# Patient Record
Sex: MALE | Race: WHITE
[De-identification: names, ages, dates, MRNs, and addresses within clinical notes are randomized per-mention and may not be internally consistent; named-entity substitution may affect disease eponyms.]

---

## 2017-01-03 ENCOUNTER — HOSPITAL ENCOUNTER (OUTPATIENT)
Dept: HOSPITAL 45 - X.SURG | Age: 62
Discharge: HOME | End: 2017-01-03
Attending: SURGERY
Payer: COMMERCIAL

## 2017-01-03 VITALS
BODY MASS INDEX: 25.11 KG/M2 | HEIGHT: 70.51 IN | HEIGHT: 70.51 IN | BODY MASS INDEX: 25.11 KG/M2 | WEIGHT: 177.36 LBS | WEIGHT: 177.36 LBS

## 2017-01-03 VITALS
DIASTOLIC BLOOD PRESSURE: 81 MMHG | HEART RATE: 74 BPM | SYSTOLIC BLOOD PRESSURE: 124 MMHG | OXYGEN SATURATION: 98 % | TEMPERATURE: 98.42 F

## 2017-01-03 DIAGNOSIS — Z82.49: ICD-10-CM

## 2017-01-03 DIAGNOSIS — Z90.89: ICD-10-CM

## 2017-01-03 DIAGNOSIS — M21.70: ICD-10-CM

## 2017-01-03 DIAGNOSIS — Z91.048: ICD-10-CM

## 2017-01-03 DIAGNOSIS — Z88.2: ICD-10-CM

## 2017-01-03 DIAGNOSIS — E03.9: ICD-10-CM

## 2017-01-03 DIAGNOSIS — K40.90: Primary | ICD-10-CM

## 2017-01-03 DIAGNOSIS — E78.00: ICD-10-CM

## 2017-01-03 DIAGNOSIS — Z86.19: ICD-10-CM

## 2017-01-03 DIAGNOSIS — Z82.61: ICD-10-CM

## 2017-01-03 DIAGNOSIS — Z83.3: ICD-10-CM

## 2017-01-03 DIAGNOSIS — E06.3: ICD-10-CM

## 2017-01-03 RX ADMIN — HYDROCODONE BITARTRATE AND ACETAMINOPHEN PRN TAB: 5; 325 TABLET ORAL at 14:50

## 2017-01-03 RX ADMIN — HYDROCODONE BITARTRATE AND ACETAMINOPHEN PRN TAB: 5; 325 TABLET ORAL at 15:00

## 2017-01-03 NOTE — HISTORY & PHYSICAL BRIDGE - [facility]
H&P Re-Evaluation


Bridge Note:


I have examined the patient, reviewed the History & Physical and in the 

interval since the performance of the History & Physical I have noted the 

following changes of clinical significance: No changes noted

## 2017-01-03 NOTE — ANESTHESIA PROGRESS NT - [facility]
Anesthesia Post Op Note


Date & Time


Karson 3, 2017 at 14:41





Vital Signs


Pain Intensity:  6





 Vital Signs Past 12 Hours








  Date Time  Temp Pulse Resp B/P Pulse Ox O2 Delivery O2 Flow Rate FiO2


 


1/3/17 14:30 36.4 77 16 144/88 95 Room Air  


 


1/3/17 14:06 36.4       


 


1/3/17 14:04    131/86    


 


1/3/17 14:01  71 11  100   


 


1/3/17 14:01  70 11     


 


1/3/17 13:59    129/90    


 


1/3/17 13:56  62 17  100   


 


1/3/17 13:56  63 17     


 


1/3/17 13:54    130/84    


 


1/3/17 13:51  66 11     


 


1/3/17 13:51  64 11  100   


 


1/3/17 13:50      Room Air  


 


1/3/17 13:49    117/86    


 


1/3/17 13:46  67 12  100   


 


1/3/17 13:46  67 12     


 


1/3/17 13:44    116/85    


 


1/3/17 13:41  74 17     


 


1/3/17 13:41  71 17  93   


 


1/3/17 13:39    119/83    


 


1/3/17 13:36  89 16  99   


 


1/3/17 13:36  88 16     


 


1/3/17 13:33    110/81    


 


1/3/17 13:32    122/85    


 


1/3/17 13:31  82 15  95   


 


1/3/17 13:31  83 15     


 


1/3/17 13:30      Nasal Cannula 4 


 


1/3/17 13:26  79 23     


 


1/3/17 13:26  78 23  97   


 


1/3/17 13:24    123/79    


 


1/3/17 13:21  74 20     


 


1/3/17 13:21  74 20  99   


 


1/3/17 13:17 36.2 62 14 131/90 99 Diffusion Mask 6 


 


1/3/17 10:56 36.5 70 16 143/89 99 Room Air  











Notes


Mental Status:  alert / awake / arousable, participated in evaluation


Pt Amnestic to Procedure:  Yes


Nausea / Vomiting:  adequately controlled


Pain:  adequately controlled


Airway Patency, RR, SpO2:  stable & adequate


BP & HR:  stable & adequate


Hydration State:  stable & adequate


Anesthetic Complications:  no major complications apparent


post op nausea improved with treatment.  no vomiting.

## 2017-01-03 NOTE — DISCHARGE INSTRUCTIONS-[facility]
Discharge Instructions


Visit


Reason for Visit:  Left Inguinal Hernia





Discharge


Discharge Diagnosis / Problem:  Lt inguinal hernia





Discharge Goals


Goal(s):  Decrease discomfort, Improve function, Improve disease control





Activity Recommendations


Activity Limitations:  as noted below


Lifting Limitations:  no more than 25 pounds


Exercise/Sports Limitations:  until after follow-up appointment


May Resume Sexual Activity:  when tolerated


Shower/Bathe:  keep incision dry (may shower over incision Thur 1/5)


Driving or Machine Use:  may drive in 4-5 days





Anesthesia


.





Post Anesthesia Instructions:





If you have had General Anesthesia or IV Sedation:





*  Do not drive today.


*  Resume driving when surgeon permits.


*  Do not make important decisions or sign legal documents today.


*  Call surgeon for:





   1.  Temperature elevations greater than 101 degrees F.


   2.  Uncontrollable pain.


   3.  Excessive bleeding.


   4.  Persistent nausea and vomiting.


   5.  Medication intolerance (nausea, vomiting or rash).





*  For nausea and vomiting use only clear liquids such as: tea, soda, bouillon 

until nausea subsides, then gradually increase diet as tolerated.





*  If you have any concerns or questions, call your surgeon's office.  If 

physician is unavailable and it is an emergency, call 911 or go to the nearest 

emergency room.





.





Instructions / Follow-Up


Instructions / Follow-Up





SPECIAL CARE INSTRUCTIONS:





*  Cover incisions and change daily for comfort/drainage.





* Leave steri strips in place





* Avoid constipation- may use Senokot S and Milk of magnesia twice daily as 

directed


       on the package





*  May use ibuprofen for pain as tolerated.





*  Expect some swelling and bruising.





Call your doctor if:





*  Temperature above 101 degrees





*  Pain not relieved by pain medicine ordered





*  There is increased drainage or redness from any incision





*  You have any unanswered questions or concerns [phone].








FOLLOW UP VISIT:





If not already scheduled, please call the office for a follow-up visit.





for next week- some sutures





OFFICE PHONE NUMBER:





Dr. Suggs Office Phone Number:  [phone]








Diet Recommendations


Home Diet:  resume previous diet





Procedures


Procedures Performed:  


Left Inguinal Hernia Open Repair with Mesh





Pending Studies


Studies pending at discharge:  no





Medical Emergencies








.


Who to Call and When:





Medical Emergencies:  If at any time you feel your situation is an emergency, 

please call 911 immediately.





.





Non-Emergent Contact


Non-Emergency issues call your:  Surgeon





.


.





"Provider Documentation" section prepared by Osmar Suggs.

## 2017-04-21 ENCOUNTER — HOSPITAL ENCOUNTER (OUTPATIENT)
Dept: HOSPITAL 45 - C.LABBC | Age: 62
Discharge: HOME | End: 2017-04-21
Attending: NURSE PRACTITIONER
Payer: COMMERCIAL

## 2017-04-21 DIAGNOSIS — E03.9: Primary | ICD-10-CM

## 2017-04-21 DIAGNOSIS — E78.00: ICD-10-CM

## 2017-04-21 LAB
ALBUMIN/GLOB SERPL: 1.5 {RATIO} (ref 0.9–2)
ALP SERPL-CCNC: 84 U/L (ref 45–117)
ALT SERPL-CCNC: 39 U/L (ref 12–78)
ANION GAP SERPL CALC-SCNC: 1 MMOL/L (ref 3–11)
AST SERPL-CCNC: 24 U/L (ref 15–37)
BUN SERPL-MCNC: 17 MG/DL (ref 7–18)
BUN/CREAT SERPL: 13.8 (ref 10–20)
CALCIUM SERPL-MCNC: 8.6 MG/DL (ref 8.5–10.1)
CHLORIDE SERPL-SCNC: 108 MMOL/L (ref 98–107)
CHOLEST/HDLC SERPL: 5.6 {RATIO}
CO2 SERPL-SCNC: 33 MMOL/L (ref 21–32)
CREAT SERPL-MCNC: 1.2 MG/DL (ref 0.6–1.4)
GLOBULIN SER-MCNC: 2.7 GM/DL (ref 2.5–4)
GLUCOSE SERPL-MCNC: 97 MG/DL (ref 70–99)
GLUCOSE UR QL: 37 MG/DL
KETONES UR QL STRIP: 144 MG/DL
NITRITE UR QL STRIP: 134 MG/DL (ref 0–150)
PH UR: 208 MG/DL (ref 0–200)
POTASSIUM SERPL-SCNC: 4.5 MMOL/L (ref 3.5–5.1)
SODIUM SERPL-SCNC: 142 MMOL/L (ref 136–145)
TSH SERPL-ACNC: 0.11 UIU/ML (ref 0.3–4.5)
VERY LOW DENSITY LIPOPROT CALC: 27 MG/DL

## 2017-07-03 ENCOUNTER — HOSPITAL ENCOUNTER (OUTPATIENT)
Dept: HOSPITAL 45 - C.LABBC | Age: 62
Discharge: HOME | End: 2017-07-03
Attending: NURSE PRACTITIONER
Payer: COMMERCIAL

## 2017-07-03 DIAGNOSIS — E03.9: Primary | ICD-10-CM

## 2017-07-03 LAB — TSH SERPL-ACNC: 0.28 UIU/ML (ref 0.3–4.5)

## 2017-09-18 ENCOUNTER — HOSPITAL ENCOUNTER (OUTPATIENT)
Dept: HOSPITAL 45 - C.LABBC | Age: 62
Discharge: HOME | End: 2017-09-18
Attending: NURSE PRACTITIONER
Payer: COMMERCIAL

## 2017-09-18 DIAGNOSIS — E03.9: Primary | ICD-10-CM

## 2017-11-17 ENCOUNTER — HOSPITAL ENCOUNTER (OUTPATIENT)
Dept: HOSPITAL 45 - C.LABBC | Age: 62
Discharge: HOME | End: 2017-11-17
Attending: NURSE PRACTITIONER
Payer: COMMERCIAL

## 2017-11-17 DIAGNOSIS — E06.3: ICD-10-CM

## 2017-11-17 DIAGNOSIS — E03.9: ICD-10-CM

## 2017-11-17 DIAGNOSIS — Z00.00: Primary | ICD-10-CM

## 2017-11-17 DIAGNOSIS — E78.00: ICD-10-CM

## 2017-11-17 LAB
ALBUMIN/GLOB SERPL: 1.5 {RATIO} (ref 0.9–2)
ALP SERPL-CCNC: 89 U/L (ref 45–117)
ALT SERPL-CCNC: 87 U/L (ref 12–78)
ANION GAP SERPL CALC-SCNC: 7 MMOL/L (ref 3–11)
AST SERPL-CCNC: 48 U/L (ref 15–37)
BUN SERPL-MCNC: 21 MG/DL (ref 7–18)
BUN/CREAT SERPL: 16.4 (ref 10–20)
CALCIUM SERPL-MCNC: 8.9 MG/DL (ref 8.5–10.1)
CHLORIDE SERPL-SCNC: 105 MMOL/L (ref 98–107)
CHOLEST/HDLC SERPL: 3.8 {RATIO}
CO2 SERPL-SCNC: 30 MMOL/L (ref 21–32)
CREAT SERPL-MCNC: 1.26 MG/DL (ref 0.6–1.4)
GLOBULIN SER-MCNC: 2.7 GM/DL (ref 2.5–4)
GLUCOSE SERPL-MCNC: 102 MG/DL (ref 70–99)
GLUCOSE UR QL: 44 MG/DL
KETONES UR QL STRIP: 100 MG/DL
NITRITE UR QL STRIP: 106 MG/DL (ref 0–150)
PH UR: 165 MG/DL (ref 0–200)
POTASSIUM SERPL-SCNC: 4.6 MMOL/L (ref 3.5–5.1)
SODIUM SERPL-SCNC: 142 MMOL/L (ref 136–145)
TSH SERPL-ACNC: 2.21 UIU/ML (ref 0.3–4.5)
VERY LOW DENSITY LIPOPROT CALC: 21 MG/DL

## 2018-05-28 ENCOUNTER — HOSPITAL ENCOUNTER (EMERGENCY)
Facility: HOSPITAL | Age: 63
Discharge: HOME | End: 2018-05-29
Attending: EMERGENCY MEDICINE | Admitting: EMERGENCY MEDICINE
Payer: COMMERCIAL

## 2018-05-28 VITALS
OXYGEN SATURATION: 99 % | HEART RATE: 63 BPM | DIASTOLIC BLOOD PRESSURE: 83 MMHG | RESPIRATION RATE: 18 BRPM | WEIGHT: 183 LBS | TEMPERATURE: 97.3 F | SYSTOLIC BLOOD PRESSURE: 145 MMHG
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
## 2018-05-29 PROCEDURE — 99282 EMERGENCY DEPT VISIT SF MDM: CPT

## 2018-05-29 RX ORDER — DOXYCYCLINE HYCLATE 100 MG/1
100 CAPSULE ORAL 2 TIMES DAILY
Qty: 28 CAPSULE | Refills: 0 | Status: SHIPPED | OUTPATIENT
Start: 2018-05-29 | End: 2018-06-12

## 2018-05-29 RX ORDER — DOXYCYCLINE HYCLATE 100 MG/1
100 CAPSULE ORAL ONCE
Status: COMPLETED | OUTPATIENT
Start: 2018-05-29 | End: 2018-05-29

## 2018-05-29 RX ADMIN — DOXYCYCLINE 100 MG: 100 CAPSULE ORAL at 00:16

## 2018-05-29 NOTE — ED PROVIDER NOTES
History  Chief Complaint   Patient presents with    Tick Bite     pt noticed a tick in the left hip area today, here for removal     HPI   patient is a 36-year-old man comes in for evaluation of tick bite  The tick is still embedded in his skin  Patient does not know how long the tick has been there but thinks a could be there for as long as 48 hours  Patient does live and work [place] area [place]  He has given orders for bed when he knows the tick so he decided to come in for evaluation  Denies fevers chills sweats  He has a 1 other tick bite denies history of Lyme disease  None       Past Medical History:   Diagnosis Date    Arthritis     Disease of thyroid gland     hypo    Hyperlipidemia        History reviewed  No pertinent surgical history  History reviewed  No pertinent family history  I have reviewed and agree with the history as documented  Social History   Substance Use Topics    Smoking status: Never Smoker    Smokeless tobacco: Never Used    Alcohol use No        Review of Systems   Constitutional: Negative  HENT: Negative  Eyes: Negative  Respiratory: Negative  Cardiovascular: Negative  Gastrointestinal: Negative  Endocrine: Negative  Genitourinary: Negative  Musculoskeletal: Negative  Skin: Negative  Tick bite     Allergic/Immunologic: Negative  Neurological: Negative  Hematological: Negative  Psychiatric/Behavioral: Negative  Physical Exam  ED Triage Vitals [05/28/18 2336]   Temperature Pulse Respirations Blood Pressure SpO2   (!) 97 3 °F (36 3 °C) 63 18 145/83 99 %      Temp Source Heart Rate Source Patient Position - Orthostatic VS BP Location FiO2 (%)   Oral Monitor Sitting Left arm --      Pain Score       2           Orthostatic Vital Signs  Vitals:    05/28/18 2336   BP: 145/83   Pulse: 63   Patient Position - Orthostatic VS: Sitting       Physical Exam   Constitutional: He is oriented to person, place, and time  He appears well-developed and well-nourished  No distress  HENT:   Head: Normocephalic and atraumatic  Right Ear: External ear normal    Left Ear: External ear normal    Mouth/Throat: Oropharynx is clear and moist    Eyes: Conjunctivae and EOM are normal  Pupils are equal, round, and reactive to light  Right eye exhibits no discharge  Left eye exhibits no discharge  No scleral icterus  Neck: Normal range of motion  Neck supple  No tracheal deviation present  No thyromegaly present  Cardiovascular: Normal rate, regular rhythm and intact distal pulses  Exam reveals no gallop and no friction rub  No murmur heard  Pulmonary/Chest: Effort normal and breath sounds normal  No stridor  No respiratory distress  He has no wheezes  He has no rales  Abdominal: Soft  Bowel sounds are normal  He exhibits no distension  There is no tenderness  There is no rebound and no guarding  Musculoskeletal: Normal range of motion  He exhibits no edema or deformity  Neurological: He is alert and oriented to person, place, and time  No cranial nerve deficit  Skin: Skin is warm and dry  No rash noted  He is not diaphoretic  No erythema  Pt has deer tick in his L hip  Redness around the tick bite, but no EM rash  Psychiatric: He has a normal mood and affect  His behavior is normal  Thought content normal    Nursing note and vitals reviewed  ED Medications  Medications   doxycycline hyclate (VIBRAMYCIN) capsule 100 mg (100 mg Oral Given 5/29/18 0016)       Diagnostic Studies  Results Reviewed     None                 No orders to display         Procedures  Procedures  Tick was removed with tweezers  Tick was pulled outward with steady pressure, while taking care not to squeeze the ticks body  Mouth parts were still embedded in the patient's skin       Phone Consults  ED Phone Contact    ED Course                               MDM  Number of Diagnoses or Management Options  Tick bite:   Diagnosis management comments: 66 yo male with tick bite  Mouth parts will likely work themselves out  Will treat with doxy for 2 weeks  Discussed with patient return precautions for both cellulitis as well as for Lyme disease  CritCare Time    Disposition  Final diagnoses:   Tick bite     Time reflects when diagnosis was documented in both MDM as applicable and the Disposition within this note     Time User Action Codes Description Comment    5/29/2018 12:12 AM Sisto Julian  XXXA] Tick bite       ED Disposition     ED Disposition Condition Comment    Discharge  Indu Marrufo discharge to home/self care  Condition at discharge: Stable        Follow-up Information     Follow up With Specialties Details Why [address] [facility] Emergency Department Emergency Medicine Go to As needed, If symptoms worsen [address] [place] [address] ED, [address] [place] [address], [id number]          Discharge Medication List as of 5/29/2018 12:23 AM      START taking these medications    Details   doxycycline hyclate (VIBRAMYCIN) 100 mg capsule Take 1 capsule (100 mg total) by mouth 2 (two) times a day for 14 days, Starting Tue 5/29/2018, Until Tue 6/12/2018, Print           No discharge procedures on file  ED Provider  Attending physically available and evaluated Indu Marrufo I managed the patient along with the ED Attending      Electronically Signed by         Jessica Ulloa MD  06/01/18 4989

## 2018-05-29 NOTE — ED ATTENDING ATTESTATION
Aj Gayle DO, saw and evaluated the patient  I have discussed the patient with the resident/non-physician practitioner and agree with the resident's/non-physician practitioner's findings, Plan of Care, and MDM as documented in the resident's/non-physician practitioner's note, except where noted  All available labs and Radiology studies were reviewed  At this point I agree with the current assessment done in the Emergency Department  I have conducted an independent evaluation of this patient a history and physical is as follows:    18-year-old male presents emergency department for report of a tick implanted in his left eye  He is in no acute distress  There is mild erythema surrounding the tick bite without erythema migrans  Take was successfully removed  Doxycycline was prescribed for 2 weeks due to the onset of erythema which was 2-3 cm in diameter      Diagnosis  Tick bite  Rash  Doxycycline 100 mg b i d  for 2 weeks to treat localized symptoms of potential Lyme      Critical Care Time  CritCare Time    Procedures

## 2018-05-29 NOTE — DISCHARGE INSTRUCTIONS
Tick Bite   WHAT YOU NEED TO KNOW:   Most tick bites are not dangerous, but ticks can pass disease or infection when they bite  A tick will bite and then move further into the skin, where it stays to feed on blood  Ticks need to be removed quickly  Serious symptoms of a tick bite need immediate treatment  DISCHARGE INSTRUCTIONS:   Medicines:   · Antibiotics:  Healthcare providers may give you antibiotics if you get an infection from the tick bite  Do not stop taking the antibiotics until you talk to your healthcare provider, even if you feel better  · Antihistamines:  Antihistamines decrease swelling and itching  · Local anesthetic:  This medicine helps to decrease pain and itching  · Skin protectant:  Skin protectants help soothe itchy, red skin  They may also keep out infection  Some examples of these medicines are calamine and zinc oxide  · Steroids: You may need to take steroids if you have a very bad reaction to your tick bite  Take steroids with food to keep your stomach from becoming upset from the medicine  Do not stop taking this medicine until you talk to your healthcare provider  · Topical steroids:  A topical steroid is medicine that you rub into your skin to decrease redness and itching  Topical steroids are available without a doctor's order  Do not use this medicine on areas of skin that are cut, scratched, or infected  · Take your medicine as directed  Call your healthcare provider if you think your medicine is not helping or if you have side effects  Tell him if you are allergic to any medicine  Keep a list of the medicines, vitamins, and herbs you take  Include the amounts, and when and why you take them  Bring the list or the pill bottles to follow-up visits  Carry your medicine list with you in case of an emergency  Follow up with your healthcare provider as directed:  Write down your questions so you remember to ask them during your visits     How to remove a tick:  Ticks must be removed as soon as possible to help prevent them from passing disease or infection  You are less likely to get sick from a tick bite if you remove the tick within 24 hours  Do the following to remove a tick:  · Soak a cotton ball with rubbing alcohol, or use a disposable alcohol wipe  Gently clean the skin around the tick  · Grasp the tick as close to your skin as possible  Pull the tick straight up and out with tweezers, or with fingertips protected by a tissue or gloves  Do not touch the tick with your bare hands  · Pull gently until the tick lets go  Do not twist or jerk the tick suddenly, because this may break off the tick's head or mouth parts  You can buy a special V-shaped device that help lift ticks out safely  Do not leave any part of the tick in your skin  · Do not crush or squeeze the tick since its body may be infected with germs  Flush the tick down the toilet  · Do not put a hot match, petroleum jelly, or fingernail polish on the tick  This does not help and may be dangerous  · After the tick is removed, clean the area of the bite with rubbing alcohol  Then wash your hands with soap and water  Care for your tick bite:  Apply ice to the bite timothy to help to decrease pain, itching and swelling  Put ice in a plastic bag  Cover the bag with a towel  Put the bag on your bite for 15 to 20 minutes every hour or as directed by your healthcare provider  Try not to scratch the bite  Prevent another tick bite:  Ticks live in areas covered by brush and grass  They may even be found in your lawn if you live in certain areas  Outdoor pets can carry ticks inside the house  Ticks can grab onto you or your clothes when you walk by grass or brush  If you go into areas that contain many trees, tall grasses, and underbrush, do the following:  · Wear protective clothing:  Wear pants and a long-sleeved shirt  Tuck your pants into your socks or boots  Tuck in your shirt   Wear sleeves that fit close to the skin at your wrists and neck  This will help prevent ticks from crawling through gaps in your clothing and onto your skin  Wear a hat in areas with trees  Wear light-colored clothing to make finding ticks easier  · Use insect repellant:  Put insect repellent on skin that is showing  The insect repellant should contain DEET  Do not put insect repellant on skin that is cut, scratched, or irritated  Do not put insect repellent on a child's face or hands  Always use soap and water to wash the insect repellant off as soon as possible once you are indoors  · Spray insect repellant onto your clothes:  Use permethrin spray  This spray kills ticks that crawl on your clothing  Be sure to spray the tops of your boots, bottom of pant legs, and sleeve cuffs  As soon as possible, wash and dry clothing that has been worn outdoors  · Check for ticks often:  Check your clothing, hair, and skin for ticks every 2 to 3 hours while you are outdoors  Carefully check the hairline, armpits, neck, and waist  Check your pets and children for ticks  Remove ticks from pets the same way as you remove them from people  · Decrease the risk of ticks in your yard:  Ticks like to live in [place], moist areas  [place] your lawn regularly to keep the grass short  Trim the grass around birdbaths and fences  Cut branches that are overgrown and take them out of the yard  Clear out leaf piles  [place] firewood in a dry, kyra area  Contact your healthcare provider if:   · You cannot remove the tick  · The tick's head is stuck in the skin  · You have questions or concerns about your condition or care  Seek care immediately or call 911 if:   · You get a fever, rash, headache, or muscle or joint pains within 1 month of a tick bite  These may be signs of a more serious disease  · You are having trouble walking or moving your legs    © 2016 [address] is for End User's use only and may not be sold, redistributed or otherwise used for commercial purposes  All illustrations and images included in CareNotes® are the copyrighted property of [employer]  or Ronaldo Pettit  The above information is an  only  It is not intended as medical advice for individual conditions or treatments  Talk to your doctor, nurse or pharmacist before following any medical regimen to see if it is safe and effective for you